# Patient Record
Sex: MALE | Race: WHITE | NOT HISPANIC OR LATINO | Employment: FULL TIME | URBAN - METROPOLITAN AREA
[De-identification: names, ages, dates, MRNs, and addresses within clinical notes are randomized per-mention and may not be internally consistent; named-entity substitution may affect disease eponyms.]

---

## 2017-03-06 ENCOUNTER — TRANSFERRED RECORDS (OUTPATIENT)
Dept: HEALTH INFORMATION MANAGEMENT | Facility: CLINIC | Age: 57
End: 2017-03-06

## 2017-07-19 ENCOUNTER — TRANSFERRED RECORDS (OUTPATIENT)
Dept: HEALTH INFORMATION MANAGEMENT | Facility: CLINIC | Age: 57
End: 2017-07-19

## 2017-07-31 ENCOUNTER — TRANSFERRED RECORDS (OUTPATIENT)
Dept: HEALTH INFORMATION MANAGEMENT | Facility: CLINIC | Age: 57
End: 2017-07-31

## 2017-08-10 ENCOUNTER — TRANSFERRED RECORDS (OUTPATIENT)
Dept: HEALTH INFORMATION MANAGEMENT | Facility: CLINIC | Age: 57
End: 2017-08-10

## 2017-09-07 ENCOUNTER — TRANSFERRED RECORDS (OUTPATIENT)
Dept: HEALTH INFORMATION MANAGEMENT | Facility: CLINIC | Age: 57
End: 2017-09-07

## 2017-10-05 ENCOUNTER — TRANSFERRED RECORDS (OUTPATIENT)
Dept: HEALTH INFORMATION MANAGEMENT | Facility: CLINIC | Age: 57
End: 2017-10-05

## 2018-01-02 ENCOUNTER — TRANSFERRED RECORDS (OUTPATIENT)
Dept: HEALTH INFORMATION MANAGEMENT | Facility: CLINIC | Age: 58
End: 2018-01-02

## 2018-06-11 ENCOUNTER — TRANSFERRED RECORDS (OUTPATIENT)
Dept: HEALTH INFORMATION MANAGEMENT | Facility: CLINIC | Age: 58
End: 2018-06-11

## 2018-10-31 ENCOUNTER — TRANSFERRED RECORDS (OUTPATIENT)
Dept: HEALTH INFORMATION MANAGEMENT | Facility: CLINIC | Age: 58
End: 2018-10-31

## 2018-11-30 ENCOUNTER — TRANSFERRED RECORDS (OUTPATIENT)
Dept: HEALTH INFORMATION MANAGEMENT | Facility: CLINIC | Age: 58
End: 2018-11-30

## 2019-02-11 ENCOUNTER — TRANSFERRED RECORDS (OUTPATIENT)
Dept: HEALTH INFORMATION MANAGEMENT | Facility: CLINIC | Age: 59
End: 2019-02-11

## 2019-03-08 ENCOUNTER — TRANSFERRED RECORDS (OUTPATIENT)
Dept: HEALTH INFORMATION MANAGEMENT | Facility: CLINIC | Age: 59
End: 2019-03-08

## 2019-04-08 ENCOUNTER — TRANSFERRED RECORDS (OUTPATIENT)
Dept: HEALTH INFORMATION MANAGEMENT | Facility: CLINIC | Age: 59
End: 2019-04-08

## 2019-04-09 ENCOUNTER — TRANSFERRED RECORDS (OUTPATIENT)
Dept: HEALTH INFORMATION MANAGEMENT | Facility: CLINIC | Age: 59
End: 2019-04-09

## 2019-06-10 ENCOUNTER — THERAPY VISIT (OUTPATIENT)
Dept: PHYSICAL THERAPY | Facility: CLINIC | Age: 59
End: 2019-06-10
Payer: COMMERCIAL

## 2019-06-10 DIAGNOSIS — M54.12 CERVICAL RADICULOPATHY: ICD-10-CM

## 2019-06-10 PROCEDURE — 97530 THERAPEUTIC ACTIVITIES: CPT | Mod: GP | Performed by: PHYSICAL THERAPIST

## 2019-06-10 PROCEDURE — 97161 PT EVAL LOW COMPLEX 20 MIN: CPT | Mod: GP | Performed by: PHYSICAL THERAPIST

## 2019-06-10 PROCEDURE — 97110 THERAPEUTIC EXERCISES: CPT | Mod: GP | Performed by: PHYSICAL THERAPIST

## 2019-06-10 NOTE — PROGRESS NOTES
Cervical Spine Evaluation    Physical Therapy Initial Examination/Evaluation  Savan[place] 10, 2019    Silvio John is a 58 year old male referred to physical therapy by Reginaldo Astorga MD for treatment of R cervical radiculopathy with Precautions/Restrictions/MD instructions evaluate and treat up to 6 visits    Therapist Impression:   1) mild-moderately impaired posture  2) UE AROM WNL  3) cervical AROM WNL, increased R sided periscapular pain with extension and retraction with extension  4) dermatomes, myotomes consistent with C8 involvement    SUBJECTIVE:  Had same symptoms about 15 years ago on L side - numbness down into the hand - found out it was due to a fatty lipoma on the upper shoulder that was removed and resolved the symptoms.    Worse with driving and throughout the day with posture    DOI/onset: 3 weeks ago  Acute Injury or Gradual Onset?: Gradual injury over time  Mechanism of Injury: unknown  Previous Treatment: massage Effect of prior treatment: temporarily relief, came back same intensity  Imaging: None  Chief Complaint/Functional Limitations:  Worse with posture throughout the day at desk and when driving   Pain: rest 4 /10, activity 5/10 Location: posterior shoulder, into medial elbow - numbness into the 4th and 5th fingers Frequency: Constant Described as: aching, dull, tingling and numbness   Alleviated by: Nothing   Progression of Symptoms: Unchanged   Time of day when pain is worse: worse throughout the day  Sleeping: No issues/uninterrupted   Occupation: sales  Job duties: prolonged sitting, keyboarding/computer use  Current HEP/exercise regimen[place], enjoys golfing but has been inhibited due to back pain  Patient's goals are reduce symptoms, improve posture     Other pertinent PMH/Red Flags: Diabetes, High Blood Pressure, Numbness/Tingling, Overweight, Progressive Neurological Deficits (BL foot neuropathy)  Barriers at home/work: None as reported by patient  Pertinent  Surgical History: lipoma excision on L shoulder 15 years ago, lumbar decompression 3/2017  Medications: High blood pressure, gabapentin for about 3 years  General health as reported by patient: fair  Return to MD:  PRN    OBJECTIVE:    Posture  Forward Head: mild  Rounded Shoulders: moderate  Thoracic Spine: NT    Range of Motion (measured in % restriction)  Flexion WNL  Extension: WNL, increase R shoulder pain  Sidebend Left: WNL  Right: WNL  Rotation Left: WNL  Right: WNL  Protraction: WNL  Retraction: WNL  Joint play: NT  Upper extremity screen: WNL, EXT/ER T8 (L), T10 (R)    Cervical Strength   Flexion: WNL  Extension: WNL    Upper Extremity   Myotomes   MMT C5- ABD C6: Elbow Flexion C6: Wrist Extension C7: Elbow Extension C7: Wrist Flexion C8: Thumb Extension T1: Finger ABD   Left 5/5 5/5 5/5 5/5 5/5 5/5 5/5   Right 5/5 5/5 5/5 5/5 5/5 5/5 4/5     Dermatomes   Light Touch C4: clavicle C5: lateral arm C6: lateral thumb C7: dormsum of middle finger C8: medial hand T1: medial arm C4-7: periscapular   Left          Right WNL WNL WNL WNL diminished WNL      Special Tests  Spurling's: Positive R, negative L  Reflexes: NA  Dermatomes: Positive C8  Myotomes: Positive C8  Neural tension: NA  Dynamic Scapular Assessment: not assessed, but mild inferior border protrusion BL at rest  Cervical ligaments (Alar and Transverse) screen: NA  Vertebral artery screen: NA    Palpation  Mild tenderness to palpation at R periscapular region, non-radiating    Assessment/Plan:  Patient is a 58 year old male with cervical and right side shoulder complaints.    Patient has the following significant findings with corresponding treatment plan.                Diagnosis 1:  R cervical radiculopathy with C8 involvement  Pain -  hot/cold therapy, US, electric stimulation, mechanical traction, manual therapy, splint/taping/bracing/orthotics, self management, education, directional preference exercise and home program  Decreased strength -  therapeutic exercise, therapeutic activities and home program  Impaired muscle performance - electric stimulation, neuro re-education and home program  Decreased function - therapeutic activities and home program  Impaired posture - neuro re-education, therapeutic activities and home program    Therapy Evaluation Codes:     Cumulative Therapy Evaluation is: Low complexity.    Previous and current functional limitations:  (See Goal Flow Sheet for this information)    Short term and Long term goals: (See Goal Flow Sheet for this information)     Communication ability:  Patient appears to be able to clearly communicate and understand verbal and written communication and follow directions correctly.  Treatment Explanation - The following has been discussed with the patient:   RX ordered/plan of care  Anticipated outcomes  Possible risks and side effects  This patient would benefit from PT intervention to resume normal activities.   Rehab potential is good.    Frequency:  1 X week, once daily  Duration:  for 6 weeks  Discharge Plan:  Achieve all LTG.  Independent in home treatment program.  Reach maximal therapeutic benefit.    Please refer to the daily flowsheet for treatment today, total treatment time and time spent performing 1:1 timed codes.

## 2019-06-12 ENCOUNTER — THERAPY VISIT (OUTPATIENT)
Dept: PHYSICAL THERAPY | Facility: CLINIC | Age: 59
End: 2019-06-12
Payer: COMMERCIAL

## 2019-06-12 DIAGNOSIS — M54.12 CERVICAL RADICULOPATHY: ICD-10-CM

## 2019-06-12 PROCEDURE — 97112 NEUROMUSCULAR REEDUCATION: CPT | Mod: GP | Performed by: PHYSICAL THERAPIST

## 2019-06-12 PROCEDURE — 97140 MANUAL THERAPY 1/> REGIONS: CPT | Mod: GP | Performed by: PHYSICAL THERAPIST

## 2019-06-12 PROCEDURE — 97110 THERAPEUTIC EXERCISES: CPT | Mod: GP | Performed by: PHYSICAL THERAPIST

## 2019-06-17 ENCOUNTER — THERAPY VISIT (OUTPATIENT)
Dept: PHYSICAL THERAPY | Facility: CLINIC | Age: 59
End: 2019-06-17
Payer: COMMERCIAL

## 2019-06-17 DIAGNOSIS — M54.12 CERVICAL RADICULOPATHY: ICD-10-CM

## 2019-06-17 PROCEDURE — 97110 THERAPEUTIC EXERCISES: CPT | Mod: GP | Performed by: PHYSICAL THERAPIST

## 2019-06-26 ENCOUNTER — TRANSFERRED RECORDS (OUTPATIENT)
Dept: HEALTH INFORMATION MANAGEMENT | Facility: CLINIC | Age: 59
End: 2019-06-26

## 2019-09-27 ENCOUNTER — TRANSFERRED RECORDS (OUTPATIENT)
Dept: HEALTH INFORMATION MANAGEMENT | Facility: CLINIC | Age: 59
End: 2019-09-27

## 2020-01-06 PROBLEM — M54.12 CERVICAL RADICULOPATHY: Status: RESOLVED | Noted: 2019-06-10 | Resolved: 2020-01-06

## 2020-01-06 NOTE — PROGRESS NOTES
Discharge Note    Progress reporting period is from initial evaluation date (please see noted date below) to Jun 17, 2019.  Linked Episodes   Type: Episode: Status: Noted: Resolved: Last update: Updated by:   PHYSICAL THERAPY R cervical radiculopathy 228647 Active 6/10/2019  6/17/2019  8:25 AM Patsy Mar PT      Comments:       Silvio failed to follow up and current status is unknown.  Please see information below for last relevant information on current status.  Patient seen for 3 visits.    SUBJECTIVE  Subjective changes noted by patient:  Feeling good with no pain in shoulder, just still having persisting numbness into the 4th and 5th fingers. Notes much more attention to posture and ergonomic set up at work which is helping a lot  .  Current pain level is 0/10.     Previous pain level was   .   Changes in function:  Yes (See Goal flowsheet attached for changes in current functional level)  Adverse reaction to treatment or activity: None    OBJECTIVE  Changes noted in objective findings: nerve tension tests all negative, mild scap dyskinesis requires cuing for proper retraction, tolerates progression of retraction well.     ASSESSMENT/PLAN  Diagnosis: R cervical radiculopathy, C8   Updated problem list and treatment plan:   Pain - HEP  STG/LTGs have been met or progress has been made towards goals:  Yes, please see goal flowsheet for most current information  Assessment of Progress: current status is unknown.    Last current status:     Self Management Plans:  HEP  I have re-evaluated this patient and find that the nature, scope, duration and intensity of the therapy is appropriate for the medical condition of the patient.  Silvio continues to require the following intervention to meet STG and LTG's:  HEP.    Recommendations:  Discharge with current home program.  Patient to follow up with MD as needed.    Please refer to the daily flowsheet for treatment today, total treatment time and time spent  performing 1:1 timed codes.

## 2020-01-24 ENCOUNTER — TRANSFERRED RECORDS (OUTPATIENT)
Dept: HEALTH INFORMATION MANAGEMENT | Facility: CLINIC | Age: 60
End: 2020-01-24

## 2020-05-27 ENCOUNTER — TRANSFERRED RECORDS (OUTPATIENT)
Dept: HEALTH INFORMATION MANAGEMENT | Facility: CLINIC | Age: 60
End: 2020-05-27

## 2020-09-29 ENCOUNTER — TRANSFERRED RECORDS (OUTPATIENT)
Dept: HEALTH INFORMATION MANAGEMENT | Facility: CLINIC | Age: 60
End: 2020-09-29

## 2020-10-08 ENCOUNTER — TRANSFERRED RECORDS (OUTPATIENT)
Dept: HEALTH INFORMATION MANAGEMENT | Facility: CLINIC | Age: 60
End: 2020-10-08

## 2020-11-13 ENCOUNTER — TRANSFERRED RECORDS (OUTPATIENT)
Dept: HEALTH INFORMATION MANAGEMENT | Facility: CLINIC | Age: 60
End: 2020-11-13

## 2020-12-21 ENCOUNTER — TRANSFERRED RECORDS (OUTPATIENT)
Dept: HEALTH INFORMATION MANAGEMENT | Facility: CLINIC | Age: 60
End: 2020-12-21

## 2021-05-06 ENCOUNTER — TRANSFERRED RECORDS (OUTPATIENT)
Dept: HEALTH INFORMATION MANAGEMENT | Facility: CLINIC | Age: 61
End: 2021-05-06

## 2022-01-07 ENCOUNTER — TRANSFERRED RECORDS (OUTPATIENT)
Dept: HEALTH INFORMATION MANAGEMENT | Facility: CLINIC | Age: 62
End: 2022-01-07

## 2022-01-09 ENCOUNTER — TRANSFERRED RECORDS (OUTPATIENT)
Dept: HEALTH INFORMATION MANAGEMENT | Facility: CLINIC | Age: 62
End: 2022-01-09

## 2022-05-23 ENCOUNTER — TRANSFERRED RECORDS (OUTPATIENT)
Dept: HEALTH INFORMATION MANAGEMENT | Facility: CLINIC | Age: 62
End: 2022-05-23

## 2022-06-17 ENCOUNTER — TRANSFERRED RECORDS (OUTPATIENT)
Dept: HEALTH INFORMATION MANAGEMENT | Facility: CLINIC | Age: 62
End: 2022-06-17

## 2022-06-28 ENCOUNTER — TELEPHONE (OUTPATIENT)
Dept: ORTHOPEDICS | Facility: CLINIC | Age: 62
End: 2022-06-28

## 2022-06-28 NOTE — TELEPHONE ENCOUNTER
RN received e-mail from Angie Shirley, [employer] representative.  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   If patient calls back, please assist patient to see Dr. Diallo next Thursday 7/7 at 8am. RN placed a hold for patient.  Thank you    Adi Bahena RN

## 2022-06-29 ENCOUNTER — TELEPHONE (OUTPATIENT)
Dept: ORTHOPEDICS | Facility: CLINIC | Age: 62
End: 2022-06-29

## 2022-06-29 NOTE — TELEPHONE ENCOUNTER
We received email from [facility] to make appt for pt for Spine surgeon consult.   See Triage note yesterday 6-28-22 from [facility] [employer] who left message with details & ortho clinic [place]  ph#[phone] for pt to call & schedule.   I called this am 6-29-22 & spoke to wife who stated they did get message & our ph# but pt will be making appt at either [facility] or a different  facility.   I explained the [employer] bundle process & that email came from Insurance so they should check with Insurance about where he can go & call us back if he needs to make appt    She understood & agreed.  Call back prn.    I removed Hold on slot that Maxim had Held.  Erika Iverson RN.

## 2022-07-13 ENCOUNTER — TELEPHONE (OUTPATIENT)
Dept: NEUROSURGERY | Facility: CLINIC | Age: 62
End: 2022-07-13

## 2022-07-13 NOTE — TELEPHONE ENCOUNTER
SPINE PATIENTS - NEW PROTOCOL PREVISIT    RECORDS RECEIVED FROM: [facility]    REASON FOR VISIT: pt ins needs 2nd opinion lower lumbar surg consult from insur   Date of Appt: 07/15/2022   NOTES (FOR ALL VISITS) STATUS DETAILS   OFFICE NOTE from referring provider N/A    OFFICE NOTE from other specialist N/A    DISCHARGE SUMMARY from hospital N/A    DISCHARGE REPORT from ER N/A    EMG REPORT N/A    OP NOTE Care Everywhere 03/06/2017 DECOMPRESSION SPINE LEVEL 02   IMAGING  (FOR ALL VISITS)     MRI (HEAD, NECK, SPINE) N/A    XRAY (SPINE) *NEUROSURGERY* N/A    CT (HEAD, NECK, SPINE) N/A       Sent request to [place] for records. Images in PACS  Litzy Jones on 7/13/2022 at 3:07 PM

## 2022-07-15 ENCOUNTER — OFFICE VISIT (OUTPATIENT)
Dept: NEUROSURGERY | Facility: CLINIC | Age: 62
End: 2022-07-15
Payer: COMMERCIAL

## 2022-07-15 ENCOUNTER — PRE VISIT (OUTPATIENT)
Dept: NEUROSURGERY | Facility: CLINIC | Age: 62
End: 2022-07-15

## 2022-07-15 ENCOUNTER — ANCILLARY PROCEDURE (OUTPATIENT)
Dept: GENERAL RADIOLOGY | Facility: CLINIC | Age: 62
End: 2022-07-15
Attending: ORTHOPAEDIC SURGERY
Payer: COMMERCIAL

## 2022-07-15 ENCOUNTER — TELEPHONE (OUTPATIENT)
Dept: NEUROLOGY | Facility: CLINIC | Age: 62
End: 2022-07-15

## 2022-07-15 VITALS
DIASTOLIC BLOOD PRESSURE: 82 MMHG | SYSTOLIC BLOOD PRESSURE: 143 MMHG | BODY MASS INDEX: 32.98 KG/M2 | HEIGHT: 74 IN | HEART RATE: 74 BPM | WEIGHT: 257 LBS

## 2022-07-15 DIAGNOSIS — G89.29 CHRONIC MIDLINE LOW BACK PAIN WITH LEFT-SIDED SCIATICA: Primary | ICD-10-CM

## 2022-07-15 DIAGNOSIS — M54.42 CHRONIC MIDLINE LOW BACK PAIN WITH LEFT-SIDED SCIATICA: Primary | ICD-10-CM

## 2022-07-15 DIAGNOSIS — M54.50 LOW BACK PAIN: ICD-10-CM

## 2022-07-15 PROCEDURE — 99204 OFFICE O/P NEW MOD 45 MIN: CPT | Performed by: ORTHOPAEDIC SURGERY

## 2022-07-15 PROCEDURE — 72110 X-RAY EXAM L-2 SPINE 4/>VWS: CPT | Performed by: STUDENT IN AN ORGANIZED HEALTH CARE EDUCATION/TRAINING PROGRAM

## 2022-07-15 RX ORDER — METOPROLOL SUCCINATE 100 MG/1
TABLET, EXTENDED RELEASE ORAL
COMMUNITY
Start: 2022-07-07

## 2022-07-15 RX ORDER — ATORVASTATIN CALCIUM 20 MG/1
TABLET, FILM COATED ORAL
COMMUNITY
Start: 2022-05-05

## 2022-07-15 RX ORDER — ASCORBIC ACID 1000 MG
TABLET ORAL
COMMUNITY

## 2022-07-15 RX ORDER — GABAPENTIN 300 MG/1
CAPSULE ORAL
COMMUNITY
Start: 2022-06-27

## 2022-07-15 RX ORDER — LISINOPRIL/HYDROCHLOROTHIAZIDE 10-12.5 MG
TABLET ORAL
COMMUNITY
Start: 2022-07-14

## 2022-07-15 ASSESSMENT — PAIN SCALES - GENERAL: PAINLEVEL: MODERATE PAIN (4)

## 2022-07-15 NOTE — TELEPHONE ENCOUNTER
Writer faxed current progress note to [facility] [facility] at patient and [facility] request.  Writer notified Dr Moe and permission to send fax given.  Faxed to [phone] [facility]  CASTRO Lawson, KALA ([facility])

## 2022-07-15 NOTE — TELEPHONE ENCOUNTER
Writer faxed signed UBALDO to [employer] for records release, requested by [facility] as records have been sent to [facility] for patient visit with Dr Moe.  \Faxed UBALDO to [employer] @ [phone].  Delivery confirmed by right fax.  CASTRO Lawson, KALA ([facility])

## 2022-07-15 NOTE — LETTER
"    7/15/2022         RE: Silvio John  [address] [address]        Dear Colleague,    Thank you for referring your patient, Silvio John, to the [facility] [place]. Please see a copy of my visit note below.    Silvio John's goals for this visit include:   Chief Complaint   Patient presents with     New Patient     Scheduling and Arrival Information  Scheduling Notes: xr ordered/pt ins needs 2nd opinion lower lumbar surg consult from insur/ see   notes in epic           He requests these members of his care team be copied on today's visit information: yes    PCP: Reginaldo Astorga    Referring Provider:  No referring provider defined for this encounter.    BP (!) 143/82 (BP Location: Right arm, Patient Position: Sitting, Cuff Size: Adult Large)   Pulse 74   Ht 1.88 m (6' 2\")   Wt 116.6 kg (257 lb)   BMI 33.00 kg/m      Do you need any medication refills at today's visit? No  CASTRO Lawson, [facility] ([facility])        Spine Surgery Consultation    REFERRING PHYSICIAN: No ref. provider found   PRIMARY CARE PHYSICIAN: Reginaldo Astorga           Chief Complaint:   New Patient (Scheduling and Arrival Information/Scheduling Notes: xr ordered/pt ins needs 2nd opinion lower lumbar surg consult from insur/ see /notes in epic//)      History of Present Illness:  Symptom Profile Including: location of symptoms, onset, severity, exacerbating/alleviating factors, previous treatments:        Silvio John is a 61 year old male who was previously followed by [facility].  He has a history of a previous lumbar decompression which did not alleviate his symptoms.  Subsequently he began care with the [facility] team, and has undergone several years of nonoperative treatments for his symptoms including physical therapy, multiple rounds of epidural injections, which previously provided a few months of relief would have since been less effective, muscle relaxants " "anti-inflammatories Tylenol and gabapentin attempts.  So it is been quite exhaustive.  Recently his treating surgeon recommended a lumbar fusion.  [facility] apparently was not within the center of excellence for his insurance, and he was sent to us from a second opinion.    On interview today he notes back pain and left worse than right radicular leg complaints, when he is standing up his left leg will go numb and he can only stand for short period of time.  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  The symptoms are mostly in an L5 distribution.         Past Medical History:   No past medical history on file.         Past Surgical History:   No past surgical history on file.         Social History:     Social History     Tobacco Use     Smoking status: Not on file     Smokeless tobacco: Not on file   Substance Use Topics     Alcohol use: Not on file            Family History:   No family history on file.         Allergies:   No Known Allergies         Medications:     Current Outpatient Medications   Medication     atorvastatin (LIPITOR) 20 MG tablet     Coenzyme Q10 (CO Q 10) 10 MG CAPS     gabapentin (NEURONTIN) 300 MG capsule     lisinopril-hydrochlorothiazide (ZESTORETIC) 10-12.5 MG tablet     metoprolol succinate ER (TOPROL XL) 100 MG 24 hr tablet     study - aspirin, IDS# [id number], 81 mg tablet     No current facility-administered medications for this visit.             Review of Systems:     A 10 point ROS was performed and reviewed. Specific responses to these questions are noted at the end of the document.         Physical Exam:   Vitals: BP (!) 143/82 (BP Location: Right arm, Patient Position: Sitting, Cuff Size: Adult Large)   Pulse 74   Ht 1.88 m (6' 2\")   Wt 116.6 kg (257 lb)   BMI 33.00 kg/m    Constitutional: awake, alert, cooperative, no apparent distress, appears stated age.    Eyes: The sclera are white.  Ears, Nose, Throat: The trachea is " midline.  Psychiatric: The patient has a normal affect.  Respiratory: breathing non-labored  Cardiovascular: The extremities are warm and perfused.  Skin: no obvious rashes or lesions.  Musculoskeletal, Neurologic, and Spine:            Lumbar Spine:    Appearance - No gross stepoffs or deformities    Motor -     L2-3: Hip flexion 5/5 R and 5/5 L strength          L3/4:  Knee extension R 5/5 and L 5/5 strength         L4/5:  Foot dorsiflexion R 5/5 L 4/5 and       EHL dorsiflexion R 4/5 L 4/5 strength         S1:  Plantarflexion/Peroneal Muscles  R 5/5 and L 5/5 strength    Sensation: intact to light touch L3-S1 distribution BLE      Neurologic:      REFLEXES Left Right                  Patella 1+ 1+   Ankle jerk 1+ 1+   Babinski No upgoing great toe No upgoing great toe   Clonus 0 beats 0 beats     Hip Exam:  No pain with hip log roll and no tenderness over the greater trochanters.    Alignment:  Patient stands with a neutral standing sagittal balance.           Imaging:   We ordered and independently reviewed new radiographs at this clinic visit. The results were discussed with the patient.  Findings include:      Lumbar radiographs show severe spondylosis multilevel    Lumbar MRI shows bilateral severe L5-S1 foraminal stenosis left worse than right, and a large disc herniation at L4-5 with CSF effacement.             Assessment and Plan:   Assessment:  61 year old male with severe lumbar spondylosis, foraminal stenosis L5-S1 and central and lateral recess stenosis L4-5, with failure of nonoperative treatment     Plan:  1. I agree with the outside surgeon.  I did review his notes.  They had recommended an L4 to pelvis fusion with decompression.  I do agree that decompressing the nerve roots adequately will require sacrificing the facet joints and thus a fusion is indicated, and I also agree that an all posterior approach with interbody would be the best way to accomplish this for the patient.  Risks of this  surgery include risk of infection, risk of dural tear resulting in CSF leak which might result in headaches, or possible need for lumbar drain, or possible revision surgery in the setting of a persistent leak. Risk of hematoma or seroma resulting in wound complications.  Possible nerve root injury resulting in numbness weakness or paralysis into the arms or legs. Possible radiculitis which could result in similar symptoms or could result in significant neurogenic type pain. There is also a risk of delayed onset nerve root pals or radiculitis, which I explained is potentially due to stretch injury or possibly due to delayed onset swelling, and is sometimes temporary but can also be permanent.  Risk of incomplete decompression which might require revision surgery in the future.  Risk of adjacent segment problems requiring surgery in the future. Risk of incomplete relief of symptoms possibly requiring revision surgery in the future. Furthermore, although rare, there are risks of major vessel injury such as to the major vessels anteriorly or to the bowel from the surgery.  Sometimes this can happen if an instrument is passed anteriorly through the disc space. There is a risk of nonunion which might require revision surgery in the future, and risk of hardware complications from the instrumentation.  I do use imaging to help guide the placement of the instrumentation, but even with this there is a chance of implant malposition or problem.  There is a risk of blood clots in the legs or the lungs.  Lastly, although rare, there are certainly risks of the anesthetic including stroke heart attack and death.    2. In most cases we need to use a bone graft extender in addition to local autograft.  The bone graft extender is usually crushed cancellous allograft from the musculoskeletal transplant foundation.  This is needed because there is usually not enough autograft available to complete the fusion.  In some cases we will also  add autogenous iliac crest autograft if the quality of the local bone is poor or very limited in quantity.    3. The patient expressed understanding he would like to proceed with surgery.  If he does choose me as a surgeon I would recommend getting a lumbar CT scan and a referral to our anesthesia clinic.  Otherwise he has followed with the [facility] orth[facility] team and would potentially want to have surgery with them and I told him I am very comfortable with them them sure they would do a great job as well.  If I can help him in any way he will reach out to me.      Respectfully,  Aaron Moe MD  Spine Surgery  [facility]          Again, thank you for allowing me to participate in the care of your patient.        Sincerely,        Aaron Moe MD

## 2022-07-15 NOTE — PROGRESS NOTES
"Silvio John's goals for this visit include:   Chief Complaint   Patient presents with     New Patient     Scheduling and Arrival Information  Scheduling Notes: xr ordered/pt ins needs 2nd opinion lower lumbar surg consult from insur/ see   notes in epic           He requests these members of his care team be copied on today's visit information: yes    PCP: Reginaldo Astorga    Referring Provider:  No referring provider defined for this encounter.    BP (!) 143/82 (BP Location: Right arm, Patient Position: Sitting, Cuff Size: Adult Large)   Pulse 74   Ht 1.88 m (6' 2\")   Wt 116.6 kg (257 lb)   BMI 33.00 kg/m      Do you need any medication refills at today's visit? No  VENU Lawson., KALA ([facility])      "

## 2022-07-15 NOTE — PROGRESS NOTES
Spine Surgery Consultation    REFERRING PHYSICIAN: No ref. provider found   PRIMARY CARE PHYSICIAN: Reginaldo Astorga           Chief Complaint:   New Patient (Scheduling and Arrival Information/Scheduling Notes: xr ordered/pt ins needs 2nd opinion lower lumbar surg consult from insur/ see /notes in epic//)      History of Present Illness:  Symptom Profile Including: location of symptoms, onset, severity, exacerbating/alleviating factors, previous treatments:        Silvio John is a 61 year old male who was previously followed by [facility].  He has a history of a previous lumbar decompression which did not alleviate his symptoms.  Subsequently he began care with the [facility] team, and has undergone several years of nonoperative treatments for his symptoms including physical therapy, multiple rounds of epidural injections, which previously provided a few months of relief would have since been less effective, muscle relaxants anti-inflammatories Tylenol and gabapentin attempts.  So it is been quite exhaustive.  Recently his treating surgeon recommended a lumbar fusion.  [facility] apparently was not within the center of excellence for his insurance, and he was sent to us from a second opinion.    On interview today he notes back pain and left worse than right radicular leg complaints, when he is standing up his left leg will go numb and he can only stand for short period of time.  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  The symptoms are mostly in an L5 distribution.         Past Medical History:   No past medical history on file.         Past Surgical History:   No past surgical history on file.         Social History:     Social History     Tobacco Use     Smoking status: Not on file     Smokeless tobacco: Not on file   Substance Use Topics     Alcohol use: Not on file            Family History:   No family history on file.         Allergies:   No  "Known Allergies         Medications:     Current Outpatient Medications   Medication     atorvastatin (LIPITOR) 20 MG tablet     Coenzyme Q10 (CO Q 10) 10 MG CAPS     gabapentin (NEURONTIN) 300 MG capsule     lisinopril-hydrochlorothiazide (ZESTORETIC) 10-12.5 MG tablet     metoprolol succinate ER (TOPROL XL) 100 MG 24 hr tablet     study - aspirin, IDS# [id number], 81 mg tablet     No current facility-administered medications for this visit.             Review of Systems:     A 10 point ROS was performed and reviewed. Specific responses to these questions are noted at the end of the document.         Physical Exam:   Vitals: BP (!) 143/82 (BP Location: Right arm, Patient Position: Sitting, Cuff Size: Adult Large)   Pulse 74   Ht 1.88 m (6' 2\")   Wt 116.6 kg (257 lb)   BMI 33.00 kg/m    Constitutional: awake, alert, cooperative, no apparent distress, appears stated age.    Eyes: The sclera are white.  Ears, Nose, Throat: The trachea is midline.  Psychiatric: The patient has a normal affect.  Respiratory: breathing non-labored  Cardiovascular: The extremities are warm and perfused.  Skin: no obvious rashes or lesions.  Musculoskeletal, Neurologic, and Spine:            Lumbar Spine:    Appearance - No gross stepoffs or deformities    Motor -     L2-3: Hip flexion 5/5 R and 5/5 L strength          L3/4:  Knee extension R 5/5 and L 5/5 strength         L4/5:  Foot dorsiflexion R 5/5 L 4/5 and       EHL dorsiflexion R 4/5 L 4/5 strength         S1:  Plantarflexion/Peroneal Muscles  R 5/5 and L 5/5 strength    Sensation: intact to light touch L3-S1 distribution BLE      Neurologic:      REFLEXES Left Right                  Patella 1+ 1+   Ankle jerk 1+ 1+   Babinski No upgoing great toe No upgoing great toe   Clonus 0 beats 0 beats     Hip Exam:  No pain with hip log roll and no tenderness over the greater trochanters.    Alignment:  Patient stands with a neutral standing sagittal balance.           Imaging:   We ordered " and independently reviewed new radiographs at this clinic visit. The results were discussed with the patient.  Findings include:      Lumbar radiographs show severe spondylosis multilevel    Lumbar MRI shows bilateral severe L5-S1 foraminal stenosis left worse than right, and a large disc herniation at L4-5 with CSF effacement.             Assessment and Plan:   Assessment:  61 year old male with severe lumbar spondylosis, foraminal stenosis L5-S1 and central and lateral recess stenosis L4-5, with failure of nonoperative treatment     Plan:  1. I agree with the outside surgeon.  I did review his notes.  They had recommended an L4 to pelvis fusion with decompression.  I do agree that decompressing the nerve roots adequately will require sacrificing the facet joints and thus a fusion is indicated, and I also agree that an all posterior approach with interbody would be the best way to accomplish this for the patient.  Risks of this surgery include risk of infection, risk of dural tear resulting in CSF leak which might result in headaches, or possible need for lumbar drain, or possible revision surgery in the setting of a persistent leak. Risk of hematoma or seroma resulting in wound complications.  Possible nerve root injury resulting in numbness weakness or paralysis into the arms or legs. Possible radiculitis which could result in similar symptoms or could result in significant neurogenic type pain. There is also a risk of delayed onset nerve root pals or radiculitis, which I explained is potentially due to stretch injury or possibly due to delayed onset swelling, and is sometimes temporary but can also be permanent.  Risk of incomplete decompression which might require revision surgery in the future.  Risk of adjacent segment problems requiring surgery in the future. Risk of incomplete relief of symptoms possibly requiring revision surgery in the future. Furthermore, although rare, there are risks of major vessel  injury such as to the major vessels anteriorly or to the bowel from the surgery.  Sometimes this can happen if an instrument is passed anteriorly through the disc space. There is a risk of nonunion which might require revision surgery in the future, and risk of hardware complications from the instrumentation.  I do use imaging to help guide the placement of the instrumentation, but even with this there is a chance of implant malposition or problem.  There is a risk of blood clots in the legs or the lungs.  Lastly, although rare, there are certainly risks of the anesthetic including stroke heart attack and death.    2. In most cases we need to use a bone graft extender in addition to local autograft.  The bone graft extender is usually crushed cancellous allograft from the musculoskeletal transplant foundation.  This is needed because there is usually not enough autograft available to complete the fusion.  In some cases we will also add autogenous iliac crest autograft if the quality of the local bone is poor or very limited in quantity.    3. The patient expressed understanding he would like to proceed with surgery.  If he does choose me as a surgeon I would recommend getting a lumbar CT scan and a referral to our anesthesia clinic.  Otherwise he has followed with the [facility] team and would potentially want to have surgery with them and I told him I am very comfortable with them them sure they would do a great job as well.  If I can help him in any way he will reach out to me.      Respectfully,  Aaron Moe MD  Spine Surgery  [facility]

## 2022-07-19 ENCOUNTER — TELEPHONE (OUTPATIENT)
Dept: NEUROLOGY | Facility: CLINIC | Age: 62
End: 2022-07-19

## 2022-07-19 NOTE — TELEPHONE ENCOUNTER
Writer received records for [facility] for patient. Records sent to [facility] for scan. Copy placed in Dr Moe's folder for review as well.  VENU Lawson., KALA ([facility])